# Patient Record
Sex: FEMALE | Race: WHITE | HISPANIC OR LATINO | Employment: FULL TIME | URBAN - METROPOLITAN AREA
[De-identification: names, ages, dates, MRNs, and addresses within clinical notes are randomized per-mention and may not be internally consistent; named-entity substitution may affect disease eponyms.]

---

## 2023-01-23 ENCOUNTER — OFFICE VISIT (OUTPATIENT)
Dept: OBSTETRICS AND GYNECOLOGY | Facility: CLINIC | Age: 38
End: 2023-01-23
Payer: COMMERCIAL

## 2023-01-23 VITALS
HEIGHT: 65 IN | SYSTOLIC BLOOD PRESSURE: 118 MMHG | BODY MASS INDEX: 32.82 KG/M2 | DIASTOLIC BLOOD PRESSURE: 80 MMHG | WEIGHT: 197 LBS

## 2023-01-23 DIAGNOSIS — Z11.3 SCREEN FOR SEXUALLY TRANSMITTED DISEASES: ICD-10-CM

## 2023-01-23 DIAGNOSIS — Z30.011 OCP (ORAL CONTRACEPTIVE PILLS) INITIATION: ICD-10-CM

## 2023-01-23 DIAGNOSIS — Z01.419 ENCOUNTER FOR GYNECOLOGICAL EXAMINATION WITHOUT ABNORMAL FINDING: Primary | ICD-10-CM

## 2023-01-23 DIAGNOSIS — Z12.4 SCREENING FOR MALIGNANT NEOPLASM OF CERVIX: ICD-10-CM

## 2023-01-23 DIAGNOSIS — R10.2 PELVIC PAIN: ICD-10-CM

## 2023-01-23 PROCEDURE — 99385 PREV VISIT NEW AGE 18-39: CPT | Performed by: OBSTETRICS & GYNECOLOGY

## 2023-01-23 PROCEDURE — 2014F MENTAL STATUS ASSESS: CPT | Performed by: OBSTETRICS & GYNECOLOGY

## 2023-01-23 PROCEDURE — 99203 OFFICE O/P NEW LOW 30 MIN: CPT | Performed by: OBSTETRICS & GYNECOLOGY

## 2023-01-23 PROCEDURE — 3008F BODY MASS INDEX DOCD: CPT | Performed by: OBSTETRICS & GYNECOLOGY

## 2023-01-23 RX ORDER — NORETHINDRONE ACETATE AND ETHINYL ESTRADIOL AND FERROUS FUMARATE 1MG-20(24)
1 KIT ORAL DAILY
Qty: 28 TABLET | Refills: 12 | Status: SHIPPED | OUTPATIENT
Start: 2023-01-23 | End: 2023-01-30 | Stop reason: SDUPTHER

## 2023-01-23 NOTE — PROGRESS NOTES
Chief Complaint  New Gyn (Patient is here for a new gyn annual.)  Patient also wants to discuss pelvic pain and her IUD    Entirety of today's encounter including patient interview, exam, and counseling performed with the aid of a medical  via the telephone.       Subjective        Anjelica Schmitt presents to [facility]  History of Present Illness  Patient is here for annual exam.  Also having troubles with pelvic pain.  She reports feeling pelvic pain, pressure, and discomfort on and off for a long time.  She has a copper type IUD that was placed about 3 years ago by Dr. Story at [place].  Patient states that she would like another form of birth control.  She would like to start on pills instead.    Menstrual History:  OB History    No obstetric history on file.          Patient's last menstrual period was 12/28/2022 (approximate).     History reviewed. No pertinent past medical history.    History reviewed. No pertinent surgical history.    Social History     Tobacco Use   • Smoking status: Never   • Smokeless tobacco: Never   Vaping Use   • Vaping Use: Never used   Substance Use Topics   • Alcohol use: Defer   • Drug use: Never       Family History   Problem Relation Age of Onset   • Cervical cancer Maternal Grandmother        Current Outpatient Medications on File Prior to Visit   Medication Sig   • PARAGARD INTRAUTERINE COPPER IU by Intrauterine route.     No current facility-administered medications on file prior to visit.     No Known Allergies    ROS:  Constitutional: No fevers, chills, sweats   Eye: No recent visual problems, denies blurry vision   HEENT: No ear pain, nasal congestion, sore throat, voice changes   Respiratory: No shortness of breath, cough, pain on breathing   Cardiovascular: No Chest pain, palpitations   Gastrointestinal: No nausea, vomiting, diarrhea, constipation   Genitourinary: No hematuria, dysuria, lesions on genitalia   Hema/Lymph: Negative  for bruising, no edema   Endocrine: Negative for excessive thirst, excessive hunger, heat or cold intolerance   Musculoskeletal: No joint pain, muscle pain, decreased range of motion   Integumentary: No rash, pruritus, abrasions, lesions   Neurologic: No weakness, numbness, headaches   Psychiatric: No anxiety, depression, mood changes       Objective   Vital Signs:  There were no vitals taken for this visit.  There is no height or weight on file to calculate BMI.             Physical Exam   Exam performed in the presence of a female chaperone  Patient has provided verbal consent to proceed with exam.    Gen: No acute distress, awake and oriented times three  HENT: Normocephalic, atraumatic, Moist mucous membranes  Eyes: PERRLA, EOMI  Lungs: Normal work of breathing, lungs clear bilaterally, no crackles/wheezes  Heart: Regular rate and rhythm, no murmurs  Breast: Symmetrical. No skin changes or nipple retractions. No lumps or masses bilaterally. No tenderness bilaterally.  Abdomen: soft, nontender, no masses or hernia, no hepatosplenomegaly, non distended, normoactive bowel sounds  Normal external female genitalia, no lesions  Urethra: Normal meatus, no caruncle  Bladder: nontender  Vagina: No blood or discharge  Cervix: No cervical motion tenderness, no lesions, no active bleeding, nonfriable, IUD strings seen at os  Uterus: Anteverted, normal size and shape, nontender  Adnexa: No masses or tenderness  External anal exam: Normal appearance, no lesions or hemorrhoids  Rectal: Deferred  Skin: Warm and dry, no rashes  Psych: Good judgement and insight, normal affect and mood  Neuro: CN 2-12 intact, no gross deficits      Result Review :                   Assessment and Plan   Diagnoses and all orders for this visit:    1. Encounter for gynecological examination without abnormal finding (Primary)    Pap - Ordered today.  STD screening - Cultures performed today.  Contraception - Options discussed with pt at length.  Risks, benefits, side effects, and alternatives to various forms of hormonal, nonhormonal and barrier methods discussed. Pt wants to have a different type of birth control.  She would like to start birth control pills.  Safe sex practices encouraged with patient.  Breast cancer screening. Patient encouraged to perform routine self breast exams. Recommend yearly clinical breast exam and mammogram starting at age 40.  Recommend pt take calcium and vitamin D supplementation as well as prenatal vitamin or folic acid if she is attempting to conceive.  Encourage aerobic exercise with at least 30 minutes 5 days/wk.  Avoid excessive alcohol use.  Patient is advised to call the office for results after 1 week if she has not seen or heard the results of any tests ordered or performed today        2. Screening for malignant neoplasm of cervi  -     IGP,CtNgTv,Apt HPV,rfx 16 / 18,45    3. Screen for sexually transmitted diseases  -     IGP,CtNgTv,Apt HPV,rfx 16 / 18,45    4. Pelvic Pain  Suspect her IUD may be out of place.  We will obtain pelvic ultrasound to look for sources of pain and also to see if the IUD is misplaced.  Patient states that she would like to have her IUD removed.  We do not have any type of forceps in the office today with which I could grasp the IUD strings to remove it.  We will have her return on another day when we have the proper instruments to perform an IUD removal.    5. Birth control initiation  Various contraceptive options discussed including natural family planning and abstinence. Paitent elects for oral contraceptive pills. Risks, benefits, alternatives discussed at length. Risks of venous thromboembolic complications discussed. Instructions for use and Sunday start discussed. Discussed condoms in first month for backup method. Also discussed condoms for STD prevention. All questions answered. Rx for Loestrin given.                Follow Up   No follow-ups on file.  Patient was given  instructions and counseling regarding her condition or for health maintenance advice. Please see specific information pulled into the AVS if appropriate.

## 2023-01-25 ENCOUNTER — PATIENT MESSAGE (OUTPATIENT)
Dept: OBSTETRICS AND GYNECOLOGY | Facility: CLINIC | Age: 38
End: 2023-01-25
Payer: COMMERCIAL

## 2023-01-25 ENCOUNTER — PATIENT ROUNDING (BHMG ONLY) (OUTPATIENT)
Dept: OBSTETRICS AND GYNECOLOGY | Facility: CLINIC | Age: 38
End: 2023-01-25
Payer: COMMERCIAL

## 2023-01-25 LAB
C TRACH RRNA CVX QL NAA+PROBE: NEGATIVE
CYTOLOGIST CVX/VAG CYTO: NORMAL
CYTOLOGY CVX/VAG DOC CYTO: NORMAL
CYTOLOGY CVX/VAG DOC THIN PREP: NORMAL
DX ICD CODE: NORMAL
HIV 1 & 2 AB SER-IMP: NORMAL
HPV GENOTYPE REFLEX: NORMAL
HPV I/H RISK 4 DNA CVX QL PROBE+SIG AMP: NEGATIVE
N GONORRHOEA RRNA CVX QL NAA+PROBE: NEGATIVE
OTHER STN SPEC: NORMAL
STAT OF ADQ CVX/VAG CYTO-IMP: NORMAL
T VAGINALIS RRNA SPEC QL NAA+PROBE: NEGATIVE

## 2023-01-30 ENCOUNTER — OFFICE VISIT (OUTPATIENT)
Dept: OBSTETRICS AND GYNECOLOGY | Facility: CLINIC | Age: 38
End: 2023-01-30
Payer: COMMERCIAL

## 2023-01-30 VITALS
SYSTOLIC BLOOD PRESSURE: 110 MMHG | BODY MASS INDEX: 33.09 KG/M2 | DIASTOLIC BLOOD PRESSURE: 68 MMHG | WEIGHT: 198.6 LBS | HEIGHT: 65 IN

## 2023-01-30 DIAGNOSIS — T83.32XA MALPOSITIONED IUD, INITIAL ENCOUNTER: Primary | ICD-10-CM

## 2023-01-30 DIAGNOSIS — Z30.41 SURVEILLANCE OF CONTRACEPTIVE PILL: ICD-10-CM

## 2023-01-30 DIAGNOSIS — Z30.432 ENCOUNTER FOR IUD REMOVAL: ICD-10-CM

## 2023-01-30 DIAGNOSIS — R10.2 PELVIC PAIN: ICD-10-CM

## 2023-01-30 DIAGNOSIS — Z30.011 OCP (ORAL CONTRACEPTIVE PILLS) INITIATION: ICD-10-CM

## 2023-01-30 PROCEDURE — 58301 REMOVE INTRAUTERINE DEVICE: CPT | Performed by: OBSTETRICS & GYNECOLOGY

## 2023-01-30 PROCEDURE — 99213 OFFICE O/P EST LOW 20 MIN: CPT | Performed by: OBSTETRICS & GYNECOLOGY

## 2023-01-30 RX ORDER — NORETHINDRONE ACETATE AND ETHINYL ESTRADIOL AND FERROUS FUMARATE 1MG-20(24)
1 KIT ORAL DAILY
Qty: 28 TABLET | Refills: 12 | Status: SHIPPED | OUTPATIENT
Start: 2023-01-30 | End: 2024-01-30

## 2023-01-31 NOTE — PROGRESS NOTES
"Chief Complaint  Gynecologic Exam (Patient is here today for follow up pelvic pain and ultrasound. )    Entirety of today's encounter including patient interview, exam, and counseling performed with the aid of a medical  via the telephone.       Subjective        [facility]  History of Present Illness  Patient was seen last week for evaluation of pelvic pain.  States that she is still having crampy lower abdominal/pelvic pain.  She is currently on her menstrual cycle today.  States this is her usual menstrual cycle.    The following portions of the patient's history were reviewed and updated as appropriate: allergies, current medications, past family history, past medical history, past social history, past surgical history, and problem list.      Objective   Vital Signs:  /68   Ht 165.1 cm (65\")   Wt 90.1 kg (198 lb 9.6 oz)   BMI 33.05 kg/m²   Estimated body mass index is 33.05 kg/m² as calculated from the following:    Height as of this encounter: 165.1 cm (65\").    Weight as of this encounter: 90.1 kg (198 lb 9.6 oz).             Physical Exam   Gen: No acute distress, awake and oriented times three  Abdomen: soft, nontender, no masses or hernia, no hepatosplenomegaly, non distended, normoactive bowel sounds  Pelvic: Exam performed in the presence of a female chaperone  Patient has provided verbal consent to proceed with exam.  Normal external female genitalia, no lesions  Urethra: Normal meatus, no caruncle  Bladder: nontender  Vagina: Moderate menstrual type blood noted, no clots  Cervix: No cervical motion tenderness, menstrual bleeding noted, IUD strings noted at the os  Uterus: Anteverted, normal size and shape, nontender  Adnexa: No masses or tenderness  External anal exam: Normal appearance, no lesions or hemorrhoids  Rectal: Deferred  Psych: Good judgement and insight, normal affect and mood      Result Review " :          Office Visit on 01/23/2023   Component Date Value Ref Range Status   • Diagnosis 01/23/2023 Comment   Final    NEGATIVE FOR INTRAEPITHELIAL LESION OR MALIGNANCY.   • Specimen adequacy: 01/23/2023 Comment   Final    Comment: Satisfactory for evaluation.  Endocervical and/or squamous metaplastic  cells (endocervical component) are present.     • Clinician Provided ICD-10: 01/23/2023 Comment   Final    Comment: Z12.4  Z11.3     • Performed by: 01/23/2023 Comment   Final    Jasper Finn Cytotechnologist (ASCP)   • . 01/23/2023 .   Final   • Note: 01/23/2023 Comment   Final    Comment: The Pap smear is a screening test designed to aid in the detection of  premalignant and malignant conditions of the uterine cervix.  It is not a  diagnostic procedure and should not be used as the sole means of detecting  cervical cancer.  Both false-positive and false-negative reports do occur.     • Method: 01/23/2023 Comment   Final    Comment: This liquid based ThinPrep(R) pap test was screened with the  use of an image guided system.     • HPV Aptima 01/23/2023 Negative  Negative Final    Comment: This nucleic acid amplification test detects fourteen high-risk  HPV types (16,18,31,33,35,39,45,51,52,56,58,59,66,68) without  differentiation.     • HPV Genotype Reflex 01/23/2023 Comment   Final    Criteria not met, HPV Genotype not performed.   • Chlamydia, Nuc. Acid Amp 01/23/2023 Negative  Negative Final   • Gonococcus by Nucleic Acid Amp 01/23/2023 Negative  Negative Final   • Trichomonas vaginosis 01/23/2023 Negative  Negative Final     Ultrasound today):  Findings:  Uterus 8.91 x 5.89 x 5.28 cm  Endometrial thickness: 0.79 cm  Patient with intrauterine device which appears to be in the lower uterine segment/upper portion of the cervix     Left ovary: 2.86 x 2.53 x 1.99 cm, volume 7.539 cm cube     Right ovary: 2.22 x 1.78 x 1.15 cm, volume 2.379 cm cube      IUD Removal Procedure Note    Type of IUD:  ParaGard  Date of  insertion:  unknown  Reason for removal:  Side effect: pelvpain pain and malpositioned IUD  Other relevant history/information:  See above    Procedure Time Documentation  The risks of the procedure were reviewed with the patient including bleeding, infection and unlikely damage to the uterus and the benefits of the procedure were explained to the patient and Verbal informed consent was obtained    Procedure Details  IUD strings visible:  yes  Local anesthesia:  None  Tenaculum used:  None  Removal:  IUD strings grasped and IUD removed intact with gentle traction.  The patient tolerated the procedure well.    All appropriate instructions regarding removal were reviewed.    Patient tolerated the procedure well without complications.    Plans for contraception:  oral contraceptives (estrogen/progesterone)    Other follow-up needed:  none    The patient was advised to call for any fever or for prolonged or severe pain or bleeding. She was advised to use OTC analgesics as needed for mild to moderate pain.     Eugenio Cowan MD  1/30/2023  21:17 EST           Assessment and Plan   Diagnoses and all orders for this visit:    1. Malpositioned IUD, initial encounter (Primary)    IUD appeared malpositioned on ultrasound today.  Explained this can limit the efficacy of a copper type IUD.  Additionally this is likely the source of her pelvic pain.  The patient has decided on IUD removal (she had actually decided this at the time of her last visit).  IUD removed today without difficulty.     2. Pelvic pain    3. Surveillance of contraceptive pill    4. Encounter for IUD removal    5. OCP (oral contraceptive pills) initiation  -     norethindrone-ethinyl estradiol-ferrous fumarate (LOESTIN 24 FE) 1-20 MG-MCG(24) per tablet; Take 1 tablet by mouth Daily.  Dispense: 28 tablet; Refill: 12    Prescription for birth control pills were sent into the pharmacy again as the patient had stated she did not get them last week.   Risk, benefits, alternatives, side effects, instructions for use given.  All questions answered.    I spent 20 minutes today on the care of this patient, including review of the chart and any pertinent prior imaging and labs, interview/exam of the patient, developing care plan, and counseling the patient on management options and excluding any time spent on other separate billable services such as performing procedures or test interpretation, if applicable.           Follow Up   No follow-ups on file.  Patient was given instructions and counseling regarding her condition or for health maintenance advice. Please see specific information pulled into the AVS if appropriate.